# Patient Record
Sex: FEMALE | Race: BLACK OR AFRICAN AMERICAN | NOT HISPANIC OR LATINO | URBAN - METROPOLITAN AREA
[De-identification: names, ages, dates, MRNs, and addresses within clinical notes are randomized per-mention and may not be internally consistent; named-entity substitution may affect disease eponyms.]

---

## 2020-03-27 ENCOUNTER — EMERGENCY (EMERGENCY)
Facility: HOSPITAL | Age: 80
LOS: 0 days | Discharge: HOME | End: 2020-03-27
Attending: EMERGENCY MEDICINE
Payer: MEDICARE

## 2020-03-27 VITALS
TEMPERATURE: 99 F | DIASTOLIC BLOOD PRESSURE: 82 MMHG | SYSTOLIC BLOOD PRESSURE: 178 MMHG | HEART RATE: 86 BPM | RESPIRATION RATE: 20 BRPM | OXYGEN SATURATION: 93 %

## 2020-03-27 VITALS — OXYGEN SATURATION: 95 % | RESPIRATION RATE: 16 BRPM

## 2020-03-27 DIAGNOSIS — B33.8 OTHER SPECIFIED VIRAL DISEASES: ICD-10-CM

## 2020-03-27 DIAGNOSIS — B97.29 OTHER CORONAVIRUS AS THE CAUSE OF DISEASES CLASSIFIED ELSEWHERE: ICD-10-CM

## 2020-03-27 DIAGNOSIS — I10 ESSENTIAL (PRIMARY) HYPERTENSION: ICD-10-CM

## 2020-03-27 DIAGNOSIS — M79.81 NONTRAUMATIC HEMATOMA OF SOFT TISSUE: ICD-10-CM

## 2020-03-27 DIAGNOSIS — R09.81 NASAL CONGESTION: ICD-10-CM

## 2020-03-27 DIAGNOSIS — R05 COUGH: ICD-10-CM

## 2020-03-27 PROCEDURE — 87635 SARS-COV-2 COVID-19 AMP PRB: CPT

## 2020-03-27 PROCEDURE — 99283 EMERGENCY DEPT VISIT LOW MDM: CPT

## 2020-03-27 NOTE — ED STATDOCS - PATIENT PORTAL LINK FT
You can access the FollowMyHealth Patient Portal offered by [facility] by registering at the following website: [email or web address]. By joining [facility]’s FollowMyHealth portal, you will also be able to view your health information using other applications (apps) compatible with our system.

## 2020-03-27 NOTE — ED STATDOCS - PROGRESS NOTE DETAILS
creole  #[id number]  79F hx htn presents to the ED for fever. fever started on monday. pt with cough some mucus no sore throat abd pain or vomiting. + body aches no one else at home is sick with covid. no chest pain or sob. no recent travel or surgeries. non smoker non drinker no allergies to meds.   Daphnie Porras PA-C COVID TESTING will EVERETTE Porras PA-C

## 2020-03-27 NOTE — ED ADULT NURSE NOTE - CHIEF COMPLAINT QUOTE
Pt was treated, evaluated and discharged by intake provider. Please see providers notes for assessment  flu-like symptoms

## 2020-03-27 NOTE — ED STATDOCS - NS ED ROS FT
Constitutional: No chills, +fever   Eyes: No visual changes  HEENT: No throat pain  CV: No chest pain  Resp: No SOB, +cough  GI: No abd pain, nausea or vomiting  : No dysuria  MSK: +body aches   Skin: No rash  Neuro: No headache

## 2020-03-27 NOTE — ED STATDOCS - CLINICAL SUMMARY MEDICAL DECISION MAKING FREE TEXT BOX
79F hx htn presents to the ED for fever. fever started on monday. pt with cough some mucus no sore throat abd pain or vomiting. + body aches no one else at home is sick with covid. no chest pain or sob. no recent travel or surgeries. non smoker non drinker no allergies to meds.  Pt O2 sat 95%, respiration rate 16, clear lungs, and in no distress. Symptoms of fever, body aches, and cough likely represent COVID however pt well appearing and low suspicion for sepsis, PE, or PNA. Will consult on home isolation and d/c with strict return precautions. 79F hx htn presents to the ED for fever. fever started on monday. pt with cough some mucus no sore throat abd pain or vomiting. + body aches no one else at home is sick with covid. no chest pain or sob. no recent travel or surgeries. non smoker non drinker no allergies to meds.  Pt O2 sat 95%, respiration rate 16, clear lungs, and in no distress. Symptoms of fever, body aches, and cough likely represent COVID however pt well appearing and low suspicion for sepsis, PE, or PNA. Will consult on home isolation and d/c with strict return precautions.      COVID TESTING will DC.  Daphnie Porras PA-C

## 2020-03-27 NOTE — ED STATDOCS - NSFOLLOWUPCLINICS_GEN_ALL_ED_FT
[facility]  Family Medicine  [address] [place] [address]  Phone: [phone]  Fax:   Follow Up Time:

## 2020-03-27 NOTE — ED STATDOCS - NS_ ATTENDINGSCRIBEDETAILS _ED_A_ED_FT
I, Tirso Trevino MD,  performed the initial face to face bedside interview with this patient regarding history of present illness, review of symptoms and relevant past medical, social and family history.  I completed an independent physical examination.  I was the initial provider who evaluated this patient.  The history, relevant review of systems, past medical and surgical history, medical decision making, and physical examination was documented by the scribe in my presence and I attest to the accuracy of the documentation.

## 2020-03-27 NOTE — ED STATDOCS - OBJECTIVE STATEMENT
creole  #[id number]  79F hx htn presents to the ED for fever. fever started on monday. pt with cough some mucus no sore throat abd pain or vomiting. + body aches no one else at home is sick with covid. no chest pain or sob. no recent travel or surgeries. non smoker non drinker no allergies to meds.

## 2020-03-27 NOTE — ED ADULT NURSE NOTE - CAS ELECT INFOMATION PROVIDED
Discharge instructions reviewed with patient verbally. Pt verbalized understanding of discharge instructions. Paper copy of instructions given to patient with Self-quarantine and COVID-19 information./DC instructions

## 2020-03-28 LAB — SARS-COV-2 RNA SPEC QL NAA+PROBE: DETECTED
